# Patient Record
Sex: MALE | Race: BLACK OR AFRICAN AMERICAN
[De-identification: names, ages, dates, MRNs, and addresses within clinical notes are randomized per-mention and may not be internally consistent; named-entity substitution may affect disease eponyms.]

---

## 2020-10-22 ENCOUNTER — HOSPITAL ENCOUNTER (EMERGENCY)
Dept: HOSPITAL 15 - ER | Age: 20
LOS: 1 days | Discharge: HOME | End: 2020-10-23
Payer: MEDICAID

## 2020-10-22 VITALS — BODY MASS INDEX: 21.13 KG/M2 | HEIGHT: 72 IN | WEIGHT: 156 LBS

## 2020-10-22 DIAGNOSIS — J45.909: ICD-10-CM

## 2020-10-22 DIAGNOSIS — Z88.2: ICD-10-CM

## 2020-10-22 DIAGNOSIS — Z20.2: ICD-10-CM

## 2020-10-22 DIAGNOSIS — N39.0: Primary | ICD-10-CM

## 2020-10-22 LAB — WBC CLUMPS UR QL AUTO: PRESENT /HPF

## 2020-10-22 PROCEDURE — 81025 URINE PREGNANCY TEST: CPT

## 2020-10-22 PROCEDURE — 96372 THER/PROPH/DIAG INJ SC/IM: CPT

## 2020-10-22 PROCEDURE — 81001 URINALYSIS AUTO W/SCOPE: CPT

## 2020-10-22 PROCEDURE — 99283 EMERGENCY DEPT VISIT LOW MDM: CPT

## 2020-10-23 VITALS — DIASTOLIC BLOOD PRESSURE: 87 MMHG | SYSTOLIC BLOOD PRESSURE: 153 MMHG

## 2021-02-09 ENCOUNTER — HOSPITAL ENCOUNTER (EMERGENCY)
Dept: HOSPITAL 15 - ER | Age: 21
LOS: 1 days | Discharge: HOME | End: 2021-02-10
Payer: MEDICAID

## 2021-02-09 VITALS — WEIGHT: 156 LBS | BODY MASS INDEX: 21.13 KG/M2 | HEIGHT: 72 IN

## 2021-02-09 DIAGNOSIS — N39.0: Primary | ICD-10-CM

## 2021-02-09 DIAGNOSIS — E86.0: ICD-10-CM

## 2021-02-09 DIAGNOSIS — Z88.2: ICD-10-CM

## 2021-02-09 LAB
ALCOHOL, URINE: < 3 MG/DL (ref 0–10)
AMPHETAMINES UR QL SCN: NEGATIVE
BARBITURATES UR QL SCN: NEGATIVE
BENZODIAZ UR QL SCN: NEGATIVE
BZE UR QL SCN: NEGATIVE
CANNABINOIDS UR QL SCN: NEGATIVE
OPIATES UR QL SCN: NEGATIVE
PCP UR QL SCN: NEGATIVE
WBC CLUMPS UR QL AUTO: PRESENT /HPF

## 2021-02-09 PROCEDURE — 96372 THER/PROPH/DIAG INJ SC/IM: CPT

## 2021-02-09 PROCEDURE — 80307 DRUG TEST PRSMV CHEM ANLYZR: CPT

## 2021-02-09 PROCEDURE — 87086 URINE CULTURE/COLONY COUNT: CPT

## 2021-02-09 PROCEDURE — 99283 EMERGENCY DEPT VISIT LOW MDM: CPT

## 2021-02-09 PROCEDURE — 81001 URINALYSIS AUTO W/SCOPE: CPT

## 2021-02-10 VITALS — SYSTOLIC BLOOD PRESSURE: 125 MMHG | DIASTOLIC BLOOD PRESSURE: 77 MMHG

## 2025-03-20 LAB
ALBUMIN SERPL-MCNC: 5 G/DL (ref 3.2–4.8)
ALP SERPL-CCNC: 57 U/L (ref 46–116)
ALT SERPL-CCNC: 19 U/L (ref 7–40)
ANION GAP SERPL CALCULATED.3IONS-SCNC: 6 MMOL/L (ref 5–15)
APTT PPP: 25.9 SEC (ref 24.5–34.5)
BILIRUB SERPL-MCNC: 0.5 MG/DL (ref 0.2–1)
BUN SERPL-MCNC: 11 MG/DL (ref 9–23)
BUN/CREAT SERPL: 9.9 (ref 10–20)
CALCIUM SERPL-MCNC: 10.4 MG/DL (ref 8.7–10.4)
CHLORIDE SERPL-SCNC: 107 MMOL/L (ref 98–107)
CO2 SERPL-SCNC: 29 MMOL/L (ref 20–31)
GLUCOSE SERPL-MCNC: 84 MG/DL (ref 74–106)
HCT VFR BLD AUTO: 46.5 % (ref 41–53)
HGB BLD-MCNC: 15.7 G/DL (ref 13.5–17.5)
INR PPP: 1.05 (ref 0.9–1.15)
MCH RBC QN AUTO: 29.9 PG (ref 28–32)
MCV RBC AUTO: 88.4 FL (ref 80–100)
NRBC BLD QL AUTO: 0.1 %
POTASSIUM SERPL-SCNC: 4.1 MMOL/L (ref 3.5–5.1)
PROT SERPL-MCNC: 7.4 G/DL (ref 5.7–8.2)
PROTHROMBIN TIME: 11.1 SEC (ref 9.3–11.8)
SODIUM SERPL-SCNC: 142 MMOL/L (ref 136–145)

## 2025-03-22 ENCOUNTER — HOSPITAL ENCOUNTER (OUTPATIENT)
Dept: HOSPITAL 15 - GI | Age: 25
Discharge: HOME | End: 2025-03-22
Attending: INTERNAL MEDICINE
Payer: MEDICAID

## 2025-03-22 VITALS
DIASTOLIC BLOOD PRESSURE: 59 MMHG | HEART RATE: 53 BPM | SYSTOLIC BLOOD PRESSURE: 103 MMHG | OXYGEN SATURATION: 100 % | RESPIRATION RATE: 53 BRPM

## 2025-03-22 VITALS — OXYGEN SATURATION: 95 % | HEART RATE: 79 BPM | RESPIRATION RATE: 11 BRPM | TEMPERATURE: 97.4 F

## 2025-03-22 VITALS — WEIGHT: 165 LBS | BODY MASS INDEX: 21.87 KG/M2 | HEIGHT: 73 IN

## 2025-03-22 DIAGNOSIS — J45.909: ICD-10-CM

## 2025-03-22 DIAGNOSIS — Z79.899: ICD-10-CM

## 2025-03-22 DIAGNOSIS — F12.90: ICD-10-CM

## 2025-03-22 DIAGNOSIS — K64.8: ICD-10-CM

## 2025-03-22 DIAGNOSIS — K62.5: Primary | ICD-10-CM

## 2025-03-22 PROCEDURE — 85730 THROMBOPLASTIN TIME PARTIAL: CPT

## 2025-03-22 PROCEDURE — 80053 COMPREHEN METABOLIC PANEL: CPT

## 2025-03-22 PROCEDURE — 85025 COMPLETE CBC W/AUTO DIFF WBC: CPT

## 2025-03-22 PROCEDURE — 99152 MOD SED SAME PHYS/QHP 5/>YRS: CPT

## 2025-03-22 PROCEDURE — 85610 PROTHROMBIN TIME: CPT

## 2025-03-22 PROCEDURE — 36415 COLL VENOUS BLD VENIPUNCTURE: CPT

## 2025-03-22 PROCEDURE — 45378 DIAGNOSTIC COLONOSCOPY: CPT

## 2025-03-22 RX ADMIN — MIDAZOLAM HYDROCHLORIDE ONE MG: 5 INJECTION INTRAMUSCULAR; INTRAVENOUS at 11:44

## 2025-03-22 RX ADMIN — FENTANYL CITRATE ONE MCG: 50 INJECTION, SOLUTION INTRAMUSCULAR; INTRAVENOUS at 11:44

## 2025-03-22 NOTE — [id number]
Procedure


-


DATE OF PROCEDURE:  March 22, 2025





SURGEON:  MARJ ROSAS MD





REFERRING PROVIDER:  Dr. Linda aSnchez MD





PROCEDURE PERFORMED:  





1 Colonoscopy with moderate sedation





PRE-PROCEDURE DIAGNOSIS:


1. History of GI bleeding





POSTPROCEDURE DIAGNOSIS:


1. Internal hemorrhoids otherwise normal colonoscopy





INDICATIONS FOR PROCEDURE:  The patient is a 24-year-old male presents for 

outpatient colonoscopy for rectal bleeding.





MEDICATIONS USED: 8 mg of Versed IV and 100 mcg IV given in incremental doses





DETAILS OF THE PROCEDURE:  Informed consent was obtained after risks, benefits, 

and alternatives, were discussed at length with the patient.  The patient gave 

consent to the procedure as well as the medication used for sedation.  The 

patient was placed in the left lateral decubitus position.   Digital rectal exam

showed internal hemorrhoids.  An Olympus variable torsion pediatric colonoscope 

was inserted into the rectum and advanced to the cecum.  The cecum was 

identified by the ileocecal valve and the appendiceal orifice.  The scope was 

then withdrawn.  The prep was excellent with only small amounts of liquid stool.

 There were no large polyps masses strictures or arteriovenous malformations.   

Retroflexion showed internal hemorrhoids.  More than 6 minutes of withdrawal 

time was noted.  The patient tolerated the procedure well.  





BOSTON BOWEL PREP SCORE:  9








COLONOSCOPY START TIME:  1150


CECUM TIME:  1151


COLONOSCOPY END TIME:1158





IMPRESSION:


1. Internal hemorrhoids otherwise normal colonoscopy 


2. No significant source of bleeding identified 





RECOMMENDATIONS:


1. Follow up in GI clinic for procedure results 


2. High-fiber diet


3. Follow up with primary care physician


4. Patient needs endoscopy for further workup





I WOULD LIKE TO THANK DR. SANCHEZ FOR THIS REFERRAL











MARJ ROSAS MD          Mar 22, 2025 12:13

## 2025-05-02 LAB
ALBUMIN SERPL-MCNC: 4.9 G/DL (ref 3.2–4.8)
ALP SERPL-CCNC: 54 U/L (ref 46–116)
ALT SERPL-CCNC: 20 U/L (ref 7–40)
ANION GAP SERPL CALCULATED.3IONS-SCNC: 7 MMOL/L (ref 5–15)
APTT PPP: 26.8 SEC (ref 24.5–34.5)
BILIRUB SERPL-MCNC: 0.6 MG/DL (ref 0.2–1)
BUN SERPL-MCNC: 11 MG/DL (ref 9–23)
BUN/CREAT SERPL: 10.4 (ref 10–20)
CALCIUM SERPL-MCNC: 10.7 MG/DL (ref 8.7–10.4)
CHLORIDE SERPL-SCNC: 106 MMOL/L (ref 98–107)
CO2 SERPL-SCNC: 29 MMOL/L (ref 20–31)
GLUCOSE SERPL-MCNC: 89 MG/DL (ref 74–106)
HCT VFR BLD AUTO: 45.8 % (ref 41–53)
HGB BLD-MCNC: 15.5 G/DL (ref 13.5–17.5)
INR PPP: 1.05 (ref 0.9–1.15)
MCH RBC QN AUTO: 29.7 PG (ref 28–32)
MCV RBC AUTO: 87.7 FL (ref 80–100)
POTASSIUM SERPL-SCNC: 4.5 MMOL/L (ref 3.5–5.1)
PROT SERPL-MCNC: 7.4 G/DL (ref 5.7–8.2)
PROTHROMBIN TIME: 11.1 SEC (ref 9.3–11.8)
SODIUM SERPL-SCNC: 142 MMOL/L (ref 136–145)

## 2025-05-03 ENCOUNTER — HOSPITAL ENCOUNTER (OUTPATIENT)
Dept: HOSPITAL 15 - GI | Age: 25
Discharge: HOME | End: 2025-05-03
Attending: INTERNAL MEDICINE
Payer: MEDICAID

## 2025-05-03 VITALS
OXYGEN SATURATION: 96 % | HEART RATE: 73 BPM | SYSTOLIC BLOOD PRESSURE: 102 MMHG | RESPIRATION RATE: 13 BRPM | DIASTOLIC BLOOD PRESSURE: 58 MMHG

## 2025-05-03 VITALS — HEART RATE: 68 BPM | TEMPERATURE: 98.9 F | OXYGEN SATURATION: 95 % | RESPIRATION RATE: 14 BRPM

## 2025-05-03 DIAGNOSIS — Z79.899: ICD-10-CM

## 2025-05-03 DIAGNOSIS — F12.90: ICD-10-CM

## 2025-05-03 DIAGNOSIS — F17.210: ICD-10-CM

## 2025-05-03 DIAGNOSIS — K21.00: Primary | ICD-10-CM

## 2025-05-03 DIAGNOSIS — Z88.2: ICD-10-CM

## 2025-05-03 DIAGNOSIS — K29.50: ICD-10-CM

## 2025-05-03 DIAGNOSIS — J45.909: ICD-10-CM

## 2025-05-03 PROCEDURE — 85610 PROTHROMBIN TIME: CPT

## 2025-05-03 PROCEDURE — 99153 MOD SED SAME PHYS/QHP EA: CPT

## 2025-05-03 PROCEDURE — 43239 EGD BIOPSY SINGLE/MULTIPLE: CPT

## 2025-05-03 PROCEDURE — 88305 TISSUE EXAM BY PATHOLOGIST: CPT

## 2025-05-03 PROCEDURE — 36415 COLL VENOUS BLD VENIPUNCTURE: CPT

## 2025-05-03 PROCEDURE — 99152 MOD SED SAME PHYS/QHP 5/>YRS: CPT

## 2025-05-03 PROCEDURE — 85025 COMPLETE CBC W/AUTO DIFF WBC: CPT

## 2025-05-03 PROCEDURE — 80053 COMPREHEN METABOLIC PANEL: CPT

## 2025-05-03 PROCEDURE — 85730 THROMBOPLASTIN TIME PARTIAL: CPT

## 2025-05-03 PROCEDURE — 88342 IMHCHEM/IMCYTCHM 1ST ANTB: CPT

## 2025-05-03 RX ADMIN — FENTANYL CITRATE ONE MCG: 50 INJECTION, SOLUTION INTRAMUSCULAR; INTRAVENOUS at 08:38

## 2025-05-03 RX ADMIN — MIDAZOLAM HYDROCHLORIDE ONE MG: 1 INJECTION, SOLUTION INTRAMUSCULAR; INTRAVENOUS at 08:50

## 2025-05-03 RX ADMIN — MIDAZOLAM HYDROCHLORIDE ONE MG: 1 INJECTION, SOLUTION INTRAMUSCULAR; INTRAVENOUS at 08:38
